# Patient Record
Sex: FEMALE | Race: WHITE | NOT HISPANIC OR LATINO | Employment: OTHER | URBAN - METROPOLITAN AREA
[De-identification: names, ages, dates, MRNs, and addresses within clinical notes are randomized per-mention and may not be internally consistent; named-entity substitution may affect disease eponyms.]

---

## 2022-06-17 ENCOUNTER — TRANSCRIBE ORDERS (OUTPATIENT)
Dept: ADMINISTRATIVE | Facility: HOSPITAL | Age: 77
End: 2022-06-17

## 2022-07-14 NOTE — PROGRESS NOTES
[facility] Cardiology  Consultation H&P  Jazmine Germain  1945  [address]  [facility] [address]     VISIT DATE:  07/15/22    PCP: Gena Barron APRN  [address]  [facility] [address]    IDENTIFICATION: A 76 y.o. female  female  with 2 children.  Current resident [facility].  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.  Previous Dr. Mayes patient    PROBLEM LIST:  1. Tachycardia/shortness of breath/cardiac valvulopathy  1. 10/18 (OSH) 2D echo: LVEF >55%.  RV systolic function normal.  No hemodynamically significant valvular aortic stenosis.  RV systolic function normal.  2. 5/21 (OSH) 2D echo: LVEF >55%.  Normal LV wall thickness, wall motion.  RV systolic function normal.  Trace TR.  Trace NE.  3. 6/22 (OSH) 2D echo: LVEF = 59%.  Normal myocardial thickness and wall motion.  RV is normal in size and systolic function.  RVSP due to TR is normal.  Trace MR.  Trace TR.  Trace NE.  2. CAD  1. 3/17 (OSH) treadmill stress test: Normal nuclear stress test.  No clinical, hemodynamic, nor electrocardiographic evidence of myocardial ischemia during exercise.  Loya treadmill score 7; representing low risk for future cardiovascular events.  SPECT imaging demonstrated normal myocardial perfusion.  LVEF = 69%.  2. 3/17 (OSH) coronary calcifications appreciated on CTA chest  3. Hyperlipidemia  1. 7/22 146/79/108/45  4. Family History of CAD - Father, mother, daughter  5. RA  6. ILD - Dr Veloz  1. Last saw 2 moths ago  7. XRT breast Ca  1. CT 3/2022 pulm fibrosis   8. History of breast cancer  9. Anaphylaxis due to IV Dye  10. Surgical  1. History of breast cancer/implant reconstruction - with subsequent implant removal.   2. HEATHER/BSO/ appy  3. Wrist fx/sx, bunionectomy  4. 2/22 shoulder fx-pinned      CC:  Chief Complaint   Patient presents with   • Rapid Heart Rate       Allergies  Allergies   Allergen Reactions   • Contrast Dye Anaphylaxis       Current  Medications    Current Outpatient Medications:   •  atorvastatin (LIPITOR) 20 MG tablet, Take 20 mg by mouth Daily., Disp: , Rfl:   •  benzonatate (TESSALON) 100 MG capsule, Take 100 mg by mouth 3 (Three) Times a Day., Disp: , Rfl:   •  citalopram (CeleXA) 20 MG tablet, Take 20 mg by mouth Daily., Disp: , Rfl:   •  Fluticasone Furoate-Vilanterol (Breo Ellipta) 100-25 MCG/INH inhaler, Inhale 1 puff Daily., Disp: , Rfl:   •  hydroxychloroquine (PLAQUENIL) 200 MG tablet, Take 200 mg by mouth 2 (Two) Times a Day., Disp: , Rfl:   •  metoprolol succinate XL (TOPROL-XL) 25 MG 24 hr tablet, Take 25 mg by mouth Daily., Disp: , Rfl:   •  mycophenolate (CELLCEPT) 500 MG tablet, Take 1,000 mg by mouth 2 (Two) Times a Day., Disp: , Rfl:   •  sulfaSALAzine (AZULFIDINE) 500 MG tablet, Take 500 mg by mouth 2 (Two) Times a Day., Disp: , Rfl:   •  fluticasone (Flonase) 50 MCG/ACT nasal spray, 50 mcg into the nostril(s) as directed by provider 2 (Two) Times a Day As Needed., Disp: , Rfl:      History of Present Illness   HPI  Jazmine Germain is a 76 y.o. year old female with the above mentioned PMH who presents for consult from Val Lora MD for evaluation of Tachycardia/functional decline.    History today reveals that the patient has had a significant functional decline over the last 2 years.  The patient reports that upon mild to sub-moderate exertion the patient experiences a progressive shortness of breath, tachypalpitations, chest pressure, and dizziness that resolve up on exertion. Regarding his significant functional decline, the patient reports that prior to the last 2 years, the patient was able to exercise by playing golf without any significant limitation.  However over the last 2 years she has had difficulty with mild to sub-moderate exertion. Of significance, the patient denies any eliciting or exacerbating factors associated with this progressive/worsening functional decline.  The patient states that she recently  "saw her pulmonologist due to her interstitial lung disease, she reported that during that evaluation she was given an inhaled steroid course. She stated that the steroid course provided some avail for the first couple of weeks.  However, despite follow-up with her pulmonologist and administration of the steroid course, the patient still has appreciated a significant and pervasive symptomatology.     Upon chart review, it appears that the patient had a stress test in 2017 that was normal but did reveal some coronary calcifications.  Due to the diagnosis of coronary calcifications, it appears the patient was initiated on statin therapy but the patient denies repeat stress testing since that stress test.    The patient reports that she was a previous patient of [facility] with Dr. Mayes but since Dr. Mayes's detention she would like to transfer care to [facility] cardiology    Pt denies any dyspnea at rest, orthopnea, PND. Pt denies history of CHF, DVT, PE, MI, CVA, TIA, or rheumatic fever.       ROS  Review of Systems   Cardiovascular: Positive for chest pain, dyspnea on exertion, near-syncope and palpitations.   Respiratory: Positive for shortness of breath.    Neurological: Positive for dizziness.   All other systems reviewed and are negative.      SOCIAL HX  Social History     Socioeconomic History   • Marital status:    Tobacco Use   • Smoking status: Former Smoker     Packs/day: 0.50     Start date:      Quit date:      Years since quittin.5   Substance and Sexual Activity   • Alcohol use: Not Currently   • Drug use: Not Currently   • Sexual activity: Defer       FAMILY HX  CAD after age 60 father/brother  COPD mother /sister    Vitals:    07/15/22 0850   BP: 100/56   BP Location: Left arm   Patient Position: Sitting   Pulse: 84   Resp: 16   Weight: 48 kg (105 lb 12.8 oz)   Height: 162.6 cm (64\")     Body mass index is 18.16 kg/m².     PHYSICAL EXAMINATION:  Vitals and nursing note " reviewed.   Constitutional:       General: Awake. Not in acute distress.     Appearance: Healthy appearance. Well-developed, underweight and not in distress.   Eyes:      General: No scleral icterus.     Conjunctiva/sclera: Conjunctivae normal.      Pupils: Pupils are equal, round, and reactive to light.   HENT:      Head: Normocephalic and atraumatic.      Nose: Nose normal.    Mouth/Throat:      Pharynx: Uvula midline.   Neck:      Vascular: No carotid bruit or JVD.      Trachea: No tracheal deviation.   Pulmonary:      Effort: Pulmonary effort is normal.      Breath sounds: Normal breath sounds. No wheezing. No rhonchi. No rales.   Cardiovascular:      Normal rate. Regular rhythm. Normal S1. Normal S2.      Murmurs: There is a grade 1 to 2/6 systolic murmur.      No gallop. No click. No rub.   Pulses:     Intact distal pulses.   Edema:     Peripheral edema absent.   Abdominal:      General: Bowel sounds are normal.      Palpations: Abdomen is soft.   Musculoskeletal:      Cervical back: Normal range of motion and neck supple. Skin:     General: Skin is warm and dry.      Findings: No erythema.   Neurological:      Mental Status: Alert, oriented to person, place, and time and oriented to person, place and time.   Psychiatric:         Attention and Perception: Attention normal.         Mood and Affect: Mood normal.         Speech: Speech normal.         Behavior: Behavior normal. Behavior is cooperative.         Diagnostic Data:    ECG 12 Lead    Date/Time: 7/15/2022 9:32 AM  Performed by: Jim Jj PA-C  Authorized by: Jim Jj PA-C   Comparison: not compared with previous ECG   Rhythm: sinus rhythm  Rate: normal  BPM: 84  Conduction: conduction normal  Conduction: 1st degree AV block  ST Segments: ST segments normal  T inversion: aVR, V1 and V2  QRS axis: right  Other findings: non-specific ST-T wave changes    Clinical impression: abnormal EKG          Lab Results   Component Value Date    CHLPL 146  07/11/2022    TRIG 108 07/11/2022    HDL 79 07/11/2022     Lab Results   Component Value Date    BUN 11 07/11/2022    CREATININE 0.67 07/11/2022     (L) 07/11/2022    K 3.6 (L) 07/11/2022    CL 94 (L) 07/11/2022    CO2 26 07/11/2022     No results found for: HGBA1C  Lab Results   Component Value Date    WBC 6.42 05/04/2022    HGB 11.5 05/04/2022    HCT 36.3 05/04/2022     05/04/2022       ASSESSMENT:   Diagnosis Plan   1. Coronary artery disease of native artery of native heart with stable angina pectoris (HCC)  ECG 12 Lead    Stress Test With Myocardial Perfusion One Day   2. CORDERO (dyspnea on exertion)  ECG 12 Lead   3. Mixed hyperlipidemia     4. Interstitial lung disease (HCC)     5. Family history of early CAD         PLAN:  1.  CAD - we will pursue Lexiscan MPS to assess for interval flow-limiting coronary artery disease.  The patient reports that she has had multiple rounds of x-ray radiation due to her prior breast cancer.  We suspect x-ray therapy induced coronary artery disease.  2.  CORDERO -patient had acceptable echocardiogram 2 weeks prior.  We will defer repeat echocardiogram.  We will pursue Lexiscan MPS to assess for flow-limiting coronary artery disease.  3.  Hyperlipidemia- lipid panel this month acceptable with LDL appreciated at goal of <70.  4.  Interstitial lung disease-patient follows with Dr. Veloz at [facility].  The patient was initiated on steroid course without significant improvement of her shortness of breath.  This persistent shortness of breath favors cardiac etiology of shortness of breath.  5.  Family history of CAD-we will order Lexiscan MPS to assess for flow-limiting coronary artery disease.      Val Lora MD, thank you for referring Ms. Germain for evaluation.  I have forwarded my electronically generated recommendations to you for review.  Please do not hesitate to call with any questions.    Scribe: Jim Jj PA-C for Dr. Naveed Quijano M.D[facility]    Naveed WINTERS  MD Mac[place], FACC

## 2022-07-15 ENCOUNTER — OFFICE VISIT (OUTPATIENT)
Dept: CARDIOLOGY | Facility: CLINIC | Age: 77
End: 2022-07-15

## 2022-07-15 VITALS
HEIGHT: 64 IN | DIASTOLIC BLOOD PRESSURE: 56 MMHG | SYSTOLIC BLOOD PRESSURE: 100 MMHG | BODY MASS INDEX: 18.06 KG/M2 | HEART RATE: 84 BPM | RESPIRATION RATE: 16 BRPM | WEIGHT: 105.8 LBS

## 2022-07-15 DIAGNOSIS — E78.2 MIXED HYPERLIPIDEMIA: ICD-10-CM

## 2022-07-15 DIAGNOSIS — I25.118 CORONARY ARTERY DISEASE OF NATIVE ARTERY OF NATIVE HEART WITH STABLE ANGINA PECTORIS: Primary | ICD-10-CM

## 2022-07-15 DIAGNOSIS — J84.9 INTERSTITIAL LUNG DISEASE: ICD-10-CM

## 2022-07-15 DIAGNOSIS — Z82.49 FAMILY HISTORY OF EARLY CAD: ICD-10-CM

## 2022-07-15 DIAGNOSIS — R06.09 DOE (DYSPNEA ON EXERTION): ICD-10-CM

## 2022-07-15 PROCEDURE — 93000 ELECTROCARDIOGRAM COMPLETE: CPT

## 2022-07-15 PROCEDURE — 99203 OFFICE O/P NEW LOW 30 MIN: CPT | Performed by: INTERNAL MEDICINE

## 2022-07-15 RX ORDER — METOPROLOL SUCCINATE 25 MG/1
25 TABLET, EXTENDED RELEASE ORAL DAILY
COMMUNITY
Start: 2022-06-20

## 2022-07-15 RX ORDER — BENZONATATE 100 MG/1
100 CAPSULE ORAL 3 TIMES DAILY
COMMUNITY
Start: 2022-06-16

## 2022-07-15 RX ORDER — ATORVASTATIN CALCIUM 20 MG/1
20 TABLET, FILM COATED ORAL DAILY
COMMUNITY
Start: 2022-05-17

## 2022-07-15 RX ORDER — FLUTICASONE PROPIONATE 50 MCG
50 SPRAY, SUSPENSION (ML) NASAL 2 TIMES DAILY PRN
COMMUNITY

## 2022-07-15 RX ORDER — HYDROXYCHLOROQUINE SULFATE 200 MG/1
200 TABLET, FILM COATED ORAL 2 TIMES DAILY
COMMUNITY
Start: 2022-05-24

## 2022-07-15 RX ORDER — SULFASALAZINE 500 MG/1
500 TABLET ORAL 2 TIMES DAILY
COMMUNITY
Start: 2022-05-24

## 2022-07-15 RX ORDER — CITALOPRAM 20 MG/1
20 TABLET ORAL DAILY
COMMUNITY
Start: 2022-05-27

## 2022-07-15 RX ORDER — MYCOPHENOLATE MOFETIL 500 MG/1
1000 TABLET ORAL 2 TIMES DAILY
COMMUNITY
Start: 2022-05-24

## 2022-08-26 ENCOUNTER — HOSPITAL ENCOUNTER (OUTPATIENT)
Dept: CARDIOLOGY | Facility: HOSPITAL | Age: 77
Discharge: HOME | End: 2022-08-26

## 2022-08-26 DIAGNOSIS — I25.118 CORONARY ARTERY DISEASE OF NATIVE ARTERY OF NATIVE HEART WITH STABLE ANGINA PECTORIS: ICD-10-CM

## 2022-08-26 LAB
BH CV REST NUCLEAR ISOTOPE DOSE: 9.7 MCI
BH CV STRESS BP STAGE 2: NORMAL
BH CV STRESS BP STAGE 4: NORMAL
BH CV STRESS COMMENTS STAGE 1: NORMAL
BH CV STRESS DOSE REGADENOSON STAGE 1: 0.4
BH CV STRESS DURATION MIN STAGE 1: 1
BH CV STRESS DURATION MIN STAGE 2: 1
BH CV STRESS DURATION MIN STAGE 3: 1
BH CV STRESS DURATION MIN STAGE 4: 1
BH CV STRESS DURATION SEC STAGE 1: 0
BH CV STRESS DURATION SEC STAGE 2: 0
BH CV STRESS DURATION SEC STAGE 3: 0
BH CV STRESS DURATION SEC STAGE 4: 0
BH CV STRESS HR STAGE 1: 82
BH CV STRESS HR STAGE 2: 97
BH CV STRESS HR STAGE 3: 95
BH CV STRESS HR STAGE 4: 93
BH CV STRESS NUCLEAR ISOTOPE DOSE: 33 MCI
BH CV STRESS O2 STAGE 1: 97
BH CV STRESS O2 STAGE 2: 99
BH CV STRESS O2 STAGE 3: 99
BH CV STRESS O2 STAGE 4: 99
BH CV STRESS PROTOCOL 1: NORMAL
BH CV STRESS RECOVERY BP: NORMAL MMHG
BH CV STRESS RECOVERY HR: 91 BPM
BH CV STRESS RECOVERY O2: 98 %
BH CV STRESS STAGE 1: 1
BH CV STRESS STAGE 2: 2
BH CV STRESS STAGE 3: 3
BH CV STRESS STAGE 4: 4
LV EF NUC BP: 72 %
MAXIMAL PREDICTED HEART RATE: 146 BPM
PERCENT MAX PREDICTED HR: 68.49 %
STRESS BASELINE BP: NORMAL MMHG
STRESS BASELINE HR: 77 BPM
STRESS O2 SAT REST: 97 %
STRESS PERCENT HR: 81 %
STRESS POST ESTIMATED WORKLOAD: 1 METS
STRESS POST EXERCISE DUR MIN: 4 MIN
STRESS POST EXERCISE DUR SEC: 0 SEC
STRESS POST O2 SAT PEAK: 99 %
STRESS POST PEAK BP: NORMAL MMHG
STRESS POST PEAK HR: 100 BPM
STRESS TARGET HR: 124 BPM

## 2022-08-26 PROCEDURE — 0 TECHNETIUM SESTAMIBI

## 2022-08-26 PROCEDURE — 78452 HT MUSCLE IMAGE SPECT MULT: CPT

## 2022-08-26 PROCEDURE — A9500 TC99M SESTAMIBI: HCPCS

## 2022-08-26 PROCEDURE — 93017 CV STRESS TEST TRACING ONLY: CPT

## 2022-08-26 PROCEDURE — 25010000002 REGADENOSON 0.4 MG/5ML SOLUTION

## 2022-08-26 PROCEDURE — 78452 HT MUSCLE IMAGE SPECT MULT: CPT | Performed by: INTERNAL MEDICINE

## 2022-08-26 PROCEDURE — 93018 CV STRESS TEST I&R ONLY: CPT | Performed by: INTERNAL MEDICINE

## 2022-08-26 RX ORDER — CAFFEINE CITRATE 20 MG/ML
60 SOLUTION INTRAVENOUS ONCE
Status: COMPLETED | OUTPATIENT
Start: 2022-08-26 | End: 2022-08-26

## 2022-08-26 RX ADMIN — TECHNETIUM TC 99M SESTAMIBI 1 DOSE: 1 INJECTION INTRAVENOUS at 10:37

## 2022-08-26 RX ADMIN — TECHNETIUM TC 99M SESTAMIBI 1 DOSE: 1 INJECTION INTRAVENOUS at 12:30

## 2022-08-26 RX ADMIN — REGADENOSON 0.4 MG: 0.08 INJECTION, SOLUTION INTRAVENOUS at 12:29

## 2022-08-26 RX ADMIN — CAFFEINE CITRATE 60 MG: 20 INJECTION, SOLUTION INTRAVENOUS at 12:39

## 2022-08-31 ENCOUNTER — TELEPHONE (OUTPATIENT)
Dept: CARDIOLOGY | Facility: CLINIC | Age: 77
End: 2022-08-31

## 2022-08-31 NOTE — TELEPHONE ENCOUNTER
Spoke wit pt - given results of low risk stress test per [facility]. She will keep scheduled FU, continue current medications and call if further issues arise.

## 2023-04-21 ENCOUNTER — OFFICE VISIT (OUTPATIENT)
Dept: CARDIOLOGY | Facility: CLINIC | Age: 78
End: 2023-04-21
Payer: MEDICARE

## 2023-04-21 VITALS
HEIGHT: 64 IN | BODY MASS INDEX: 19.97 KG/M2 | HEART RATE: 84 BPM | SYSTOLIC BLOOD PRESSURE: 96 MMHG | DIASTOLIC BLOOD PRESSURE: 48 MMHG | OXYGEN SATURATION: 96 % | WEIGHT: 117 LBS

## 2023-04-21 DIAGNOSIS — I25.10 CORONARY ARTERY CALCIFICATION: Primary | ICD-10-CM

## 2023-04-21 DIAGNOSIS — I25.84 CORONARY ARTERY CALCIFICATION: Primary | ICD-10-CM

## 2023-04-21 DIAGNOSIS — E78.5 DYSLIPIDEMIA: ICD-10-CM

## 2023-04-21 DIAGNOSIS — J84.9 INTERSTITIAL LUNG DISEASE: ICD-10-CM

## 2023-04-21 DIAGNOSIS — I95.9 HYPOTENSION, UNSPECIFIED HYPOTENSION TYPE: ICD-10-CM

## 2023-04-21 PROCEDURE — 1159F MED LIST DOCD IN RCRD: CPT | Performed by: INTERNAL MEDICINE

## 2023-04-21 PROCEDURE — 1160F RVW MEDS BY RX/DR IN RCRD: CPT | Performed by: INTERNAL MEDICINE

## 2023-04-21 PROCEDURE — 99214 OFFICE O/P EST MOD 30 MIN: CPT | Performed by: INTERNAL MEDICINE

## 2023-04-21 RX ORDER — DIPHENOXYLATE HYDROCHLORIDE AND ATROPINE SULFATE 2.5; .025 MG/1; MG/1
TABLET ORAL
COMMUNITY

## 2023-04-21 RX ORDER — LANOLIN ALCOHOL/MO/W.PET/CERES
CREAM (GRAM) TOPICAL
COMMUNITY

## 2023-04-21 RX ORDER — LEVOTHYROXINE SODIUM 0.1 MG/1
TABLET ORAL
COMMUNITY
Start: 2023-02-16

## 2023-04-21 RX ORDER — MIRTAZAPINE 7.5 MG/1
TABLET, FILM COATED ORAL
COMMUNITY
Start: 2023-03-06

## 2023-04-21 RX ORDER — GABAPENTIN 300 MG/1
CAPSULE ORAL
COMMUNITY
Start: 2023-03-29

## 2023-04-21 RX ORDER — TRIAMCINOLONE ACETONIDE 1 MG/G
CREAM TOPICAL
COMMUNITY
Start: 2023-04-04

## 2023-04-21 NOTE — PROGRESS NOTES
[facility] Cardiology  Office Progress Note  Jazmine Germain  1945  [address] [facility] [address]       Visit Date: 04/21/23    PCP: Gena Barron, DISHA  [address]  [facility] [address]    IDENTIFICATION: A 77 y.o. female  with 2 children.  Current resident [facility].  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 Tachycardia/shortness of breath/cardiac valvulopathy  1. 10/18 (OSH) 2D echo: LVEF >55%.  RV systolic function normal.  No hemodynamically significant valvular aortic stenosis.  RV systolic function normal.  2. 5/21 (OSH) 2D echo: LVEF >55%.  Normal LV wall thickness, wall motion.  RV systolic function normal.  Trace TR.  Trace TN.  3. 6/22 (OSH) 2D echo: LVEF = 59%.  Normal myocardial thickness and wall motion.  RV is normal in size and systolic function.  RVSP due to TR is normal.  Trace MR.  Trace TR.  Trace TN.  2. CAD  1. 3/17 (OSH) treadmill stress test: Normal nuclear stress test.  No clinical, hemodynamic, nor electrocardiographic evidence of myocardial ischemia during exercise.  Loya treadmill score 7; representing low risk for future cardiovascular events.  SPECT imaging demonstrated normal myocardial perfusion.  LVEF = 69%.  2. 3/17 (OSH) coronary calcifications on CTA chest  3. 8/22 Lexiscan cardiolite wnl  3. Hyperlipidemia  1. 7/22 146/79/108/45  4. Family History of CAD - Father, mother, daughter  5. RA  6. ILD - Dr Veloz  1. Last saw 2 moths ago  7. XRT breast Ca  1. CT 3/2022 pulm fibrosis   8. History of breast cancer  9. Anaphylaxis due to IV Dye  10. Surgical  1. History of breast cancer/implant reconstruction - with subsequent implant removal.   2. HEATHER/BSO/ appy  3. Wrist fx/sx, bunionectomy  4. 2/22 shoulder fx-pinned        CC:   Chief Complaint   Patient presents with   • Coronary Artery Disease   • CORDERO   • Hyperlipidemia   • Interstitial Lung Dz       Allergies  Allergies    Allergen Reactions   • Contrast Dye (Echo Or Unknown Ct/Mr) Anaphylaxis   • Nsaids Unknown (See Comments)       Current Medications    Current Outpatient Medications:   •  atorvastatin (LIPITOR) 20 MG tablet, Take 1 tablet by mouth Daily., Disp: , Rfl:   •  benzonatate (TESSALON) 100 MG capsule, Take 1 capsule by mouth 3 (Three) Times a Day., Disp: , Rfl:   •  Biotin 300 MCG tablet, 1 tab(s) orally once a day, Disp: , Rfl:   •  Calcium-Vitamin D 600-5 MG-MCG tablet, Calcium-Vitamin D, Disp: , Rfl:   •  citalopram (CeleXA) 20 MG tablet, Take 1 tablet by mouth Daily., Disp: , Rfl:   •  fluticasone (FLONASE) 50 MCG/ACT nasal spray, 1 spray into the nostril(s) as directed by provider 2 (Two) Times a Day As Needed., Disp: , Rfl:   •  gabapentin (NEURONTIN) 300 MG capsule, , Disp: , Rfl:   •  levothyroxine (SYNTHROID, LEVOTHROID) 100 MCG tablet, , Disp: , Rfl:   •  LORATADINE PO, Take 1 tablet by mouth Daily., Disp: , Rfl:   •  metoprolol succinate XL (TOPROL-XL) 25 MG 24 hr tablet, Take 1 tablet by mouth Daily., Disp: , Rfl:   •  mirtazapine (REMERON) 7.5 MG tablet, , Disp: , Rfl:   •  multivitamin (THERAGRAN) tablet tablet, , Disp: , Rfl:   •  triamcinolone (KENALOG) 0.1 % cream, APPLY CREAM EXTERNALLY TO AFFECTED AREA TWICE DAILY FOR UP TO A MONTH, TAPERING AS IMPROVES. AVOID FACE, ARMPITS, OR GROIN, Disp: , Rfl:   •  Fluticasone Furoate-Vilanterol (BREO ELLIPTA) 100-25 MCG/INH inhaler, Inhale 1 puff Daily., Disp: , Rfl:   •  hydroxychloroquine (PLAQUENIL) 200 MG tablet, Take 1 tablet by mouth 2 (Two) Times a Day., Disp: , Rfl:   •  sulfaSALAzine (AZULFIDINE) 500 MG tablet, Take 1 tablet by mouth 2 (Two) Times a Day., Disp: , Rfl:       History of Present Illness   Jazmine Germain is a 77 y.o. year old female here for follow up on CAD, dyspnea on exertion, dyslipidemia, and ILD.  She is also first time last year due to increased shortness of breath on exertion as well as coronary calcifications noted on CT.  She  "has a history of interstitial lung disease secondary to radiation for her breast cancer.  Subsequent Lexiscan within normal limits no signs of ischemia.  Cholesterol is under control as of last lipid panel.  Her increased dyspnea on exertion seem to be been improved once her Synthroid dose was increased.  Blood pressure little low today at 96/48.    Pt denies any chest pain, orthopnea, PND, palpitations, lower extremity edema, or claudication.      OBJECTIVE:  Vitals:    04/21/23 1318   BP: 96/48   BP Location: Left arm   Patient Position: Sitting   Pulse: 84   SpO2: 96%   Weight: 53.1 kg (117 lb)   Height: 162.6 cm (64.02\")     Body mass index is 20.07 kg/m².    Constitutional:       Appearance: Not in distress.      Comments: Frail   Neck:      Vascular: No JVR.   Pulmonary:      Effort: Pulmonary effort is normal.      Breath sounds: Normal breath sounds. No wheezing. No rhonchi. No rales.   Cardiovascular:      PMI at left midclavicular line. Normal rate. Regular rhythm. Normal S1. Normal S2.      Murmurs: There is no murmur.      No gallop. No click. No rub.   Pulses:     Intact distal pulses.   Edema:     Peripheral edema absent.   Abdominal:      Palpations: Abdomen is soft.      Tenderness: There is no abdominal tenderness.   Skin:     General: Skin is warm and dry.   Neurological:      General: No focal deficit present.      Mental Status: Alert and oriented to person, place and time.         Diagnostic Data:  Procedures      ASSESSMENT:   Diagnosis Plan   1. Coronary artery calcification        2. Dyslipidemia        3. Interstitial lung disease        4. Hypotension, unspecified hypotension type            PLAN:  Coronary Artery Calcifications-patient's dyspnea on exertion drastically improved since increasing her thyroid medication..  Lexiscan stress testing last fall within normal limits no signs of ischemia.  Patient reports no functional patient's out of proportion to her age.  She is able to do all " her daily activities at home independently.    Dyslipidemia- Lipid panel 7/22 at goal.  Continue atorvastatin.    ILD- Contributes to her mild shortness of breath.  Continue to attend followups with specialist.    Hypotension-patient with low blood pressure in office today at 96/48.  She has had relatively low blood pressures at other office visits as well.  Patient notified to take a daily blood pressure log for a week and call us with results.  If this shows continued hypotension, we will  wean off metoprolol.  Patient encouraged to increase fluid intake as she reports very little.  Fall prevention was discussed in detail.    Scribed by Gallito Rizvi PA-C for Naveed Quijano MD, [facility]

## 2023-05-02 ENCOUNTER — TELEPHONE (OUTPATIENT)
Dept: CARDIOLOGY | Facility: CLINIC | Age: 78
End: 2023-05-02
Payer: MEDICARE

## 2023-05-02 NOTE — TELEPHONE ENCOUNTER
Patient called to recommend weaning off metoprolol as her blood pressures have been low. She say she feels fine, but discontinued her metoprolol on her own 3 days ago. I recommended taking blood pressures several times a week and if she ever experiences dizzy spells, lightheadedness or feels like she is about to pass out and call our office with any issues.     ----- Message from Jana Damon RN sent at 5/2/2023  9:56 AM EDT -----  Regarding: FW: Blood pressure readings  Contact: [phone]    ----- Message -----  From: Jazmine Germain  Sent: 5/1/2023   7:21 PM EDT  To: [facility] Clinical Pool  Subject: Blood pressure readings                          Your PA asked me to take BP readings for a week.  I took them 1st thing in the morning  4/22. 115/48  4/24. 110/50  4/25. 109/57  4/27.  94/55  4/28. 93/57  4/29. 112/61  5/1.     113/59    Thanks,  Jazmine Germain